# Patient Record
Sex: FEMALE | URBAN - METROPOLITAN AREA
[De-identification: names, ages, dates, MRNs, and addresses within clinical notes are randomized per-mention and may not be internally consistent; named-entity substitution may affect disease eponyms.]

---

## 2022-04-28 PROBLEM — Z00.00 ENCOUNTER FOR PREVENTIVE HEALTH EXAMINATION: Status: ACTIVE | Noted: 2022-04-28

## 2023-11-04 ENCOUNTER — EMERGENCY (EMERGENCY)
Facility: HOSPITAL | Age: 88
LOS: 1 days | Discharge: HOME | End: 2023-11-04
Attending: STUDENT IN AN ORGANIZED HEALTH CARE EDUCATION/TRAINING PROGRAM | Admitting: STUDENT IN AN ORGANIZED HEALTH CARE EDUCATION/TRAINING PROGRAM
Payer: MEDICARE

## 2023-11-04 VITALS
OXYGEN SATURATION: 97 % | HEART RATE: 83 BPM | RESPIRATION RATE: 17 BRPM | SYSTOLIC BLOOD PRESSURE: 172 MMHG | DIASTOLIC BLOOD PRESSURE: 83 MMHG

## 2023-11-04 VITALS
WEIGHT: 100.09 LBS | DIASTOLIC BLOOD PRESSURE: 78 MMHG | OXYGEN SATURATION: 94 % | TEMPERATURE: 98 F | RESPIRATION RATE: 19 BRPM | HEART RATE: 89 BPM | SYSTOLIC BLOOD PRESSURE: 138 MMHG

## 2023-11-04 PROCEDURE — 99284 EMERGENCY DEPT VISIT MOD MDM: CPT

## 2023-11-04 PROCEDURE — 72192 CT PELVIS W/O DYE: CPT | Mod: MA

## 2023-11-04 PROCEDURE — 72125 CT NECK SPINE W/O DYE: CPT | Mod: 26,MA

## 2023-11-04 PROCEDURE — 70450 CT HEAD/BRAIN W/O DYE: CPT | Mod: 26,MA

## 2023-11-04 PROCEDURE — 72125 CT NECK SPINE W/O DYE: CPT | Mod: MA

## 2023-11-04 PROCEDURE — 70450 CT HEAD/BRAIN W/O DYE: CPT | Mod: MA

## 2023-11-04 PROCEDURE — 99284 EMERGENCY DEPT VISIT MOD MDM: CPT | Mod: 25

## 2023-11-04 PROCEDURE — 72192 CT PELVIS W/O DYE: CPT | Mod: 26,MA

## 2023-11-04 NOTE — ED PROVIDER NOTE - PHYSICAL EXAMINATION
General appearance: Nontoxic appearing, conversant, afebrile   Eyes: anicteric sclerae, FELISA, EOMI  HENT: Atraumatic; oropharynx clear, MMM and no ulcerations, no pharyngeal erythema or exudate  Neck: Trachea midline; Full range of motion, supple  Pulm: CTA bl, normal respiratory effort and no intercostal retractions, normal work of breathing  CV: Systolic murmur noted, RRR, No rubs, or gallops  Abdomen: Soft, non-tender, non-distended; no guarding or rebound  Extremities: No peripheral edema, no gross deformities, FROM x4, 5/5 MS x4, gross sensation intact   Muscular Skeletal: Left Hip Tenderness on palpation   Skin: Left hand hematoma. Dry, normal temperature, turgor and texture; no rash  Psych: A&Ox2 at baseline. Appropriate affect, cooperative

## 2023-11-04 NOTE — ED PROVIDER NOTE - PATIENT PORTAL LINK FT
You can access the FollowMyHealth Patient Portal offered by [facility] by registering at the following website: [email or web address]. By joining [facility]’s FollowMyHealth portal, you will also be able to view your health information using other applications (apps) compatible with our system.

## 2023-11-04 NOTE — ED PROVIDER NOTE - MUSCULOSKELETAL, MLM
Spine appears normal, no midline tenderness, deformity, instability or step-off of C/T/L spine.  Pelvis Stable, tenderness noted to pelvis; superior illiac crest/anterior illiac spine. Full passive ROM of hips.

## 2023-11-04 NOTE — ED PROVIDER NOTE - NEUROLOGICAL, MLM
Spoke with patient and she is doing well after surgery. Patient has no questions or concerns.    Alert no focal deficits

## 2023-11-04 NOTE — ED PROVIDER NOTE - NSFOLLOWUPINSTRUCTIONS_ED_ALL_ED_FT
-Follow-up with your doctor.    -Continue medications as prescribed.    -Tylenol as directed for pain.    -Ice for not more than 20 minutes as often as every 2-3 hours as needed for pain to left hip contusion.    -Return to ED for any concerns.    Head Injury, Adult    There are many types of head injuries. They can be as minor as a small bump. Some head injuries can be worse. Worse injuries include:  A strong hit to the head that shakes the brain back and forth, causing damage (concussion).  A bruise (contusion) of the brain. This means there is bleeding in the brain that can cause swelling.  A cracked skull (skull fracture).  Bleeding in the brain that gathers, gets thick (makes a clot), and forms a bump (hematoma).  Most problems from a head injury come in the first 24 hours. However, you may still have side effects up to 7–10 days after your injury. It is important to watch your condition for any changes. You may need to be watched in the emergency department or urgent care, or you may need to stay in the hospital.    What are the causes?  There are many possible causes of a head injury. A serious head injury may be caused by:  A car accident.  Bicycle or motorcycle accidents.  Sports injuries.  Falls.  Being hit by an object.  What are the signs or symptoms?  Symptoms of a head injury include a bruise, bump, or bleeding where the injury happened. Other physical symptoms may include:  Headache.  Feeling like you may vomit (nauseous) or vomiting.  Dizziness.  Blurred or double vision.  Being uncomfortable around bright lights or loud noises.  Shaking movements that you cannot control (seizures).  Feeling tired.  Trouble being woken up.  Fainting or loss of consciousness.  Mental or emotional symptoms may include:  Feeling grumpy or cranky.  Confusion and memory problems.  Having trouble paying attention or concentrating.  Changes in eating or sleeping habits.  Feeling worried or nervous (anxious).  Feeling sad (depressed).  How is this treated?  Treatment for this condition depends on how severe the injury is and the type of injury you have. The main goal is to prevent problems and to allow the brain time to heal.    Mild head injury    If you have a mild head injury, you may be sent home, and treatment may include:  Being watched. A responsible adult should stay with you for 24 hours after your injury and check on you often.  Physical rest.  Brain rest.  Pain medicines.  Severe head injury    If you have a severe head injury, treatment may include:  Being watched closely. This includes staying in the hospital.  Medicines to:  Help with pain.  Prevent seizures.  Help with brain swelling.  Protecting your airway and using a machine that helps you breathe (ventilator).  Treatments to watch for and manage swelling inside the brain.  Brain surgery. This may be needed to:  Remove a collection of blood or blood clots.  Stop the bleeding.  Remove a part of the skull. This allows room for the brain to swell.  Follow these instructions at home:  Activity    Rest.  Avoid activities that are hard or tiring.  Make sure you get enough sleep.  Let your brain rest. Do this by limiting activities that need a lot of thought or attention, such as:  Watching TV.  Playing memory games and puzzles.  Job-related work or homework.  Working on the computer, social media, and texting.  Avoid activities that could cause another head injury until your doctor says it is okay. This includes playing sports. Having another head injury, especially before the first one has healed, can be dangerous.  Ask your doctor when it is safe for you to go back to your normal activities, such as work or school. Ask your doctor for a step-by-step plan for slowly going back to your normal activities.  Ask your doctor when you can drive, ride a bicycle, or use heavy machinery. Do not do these activities if you are dizzy.  Lifestyle      Do not drink alcohol until your doctor says it is okay.  Do not use drugs.  If it is harder than usual to remember things, write them down.  If you are easily distracted, try to do one thing at a time.  Talk with family members or close friends when making important decisions.  Tell your friends, family, a trusted co-worker, and  about your injury, symptoms, and limits (restrictions). Have them watch for any problems that are new or getting worse.  General instructions    Take over-the-counter and prescription medicines only as told by your doctor.  Have someone stay with you for 24 hours after your head injury. This person should watch you for any changes in your symptoms and be ready to get help.  Keep all follow-up visits as told by your doctor. This is important.  How is this prevented?    Work on your balance and strength. This can help you avoid falls.  Wear a seat belt when you are in a moving vehicle.  Wear a helmet when you:  Ride a bicycle.  Ski.  Do any other sport or activity that has a risk of injury.  If you drink alcohol:  Limit how much you use to:  0–1 drink a day for nonpregnant women.  0–2 drinks a day for men.  Be aware of how much alcohol is in your drink. In the [place] one drink equals one 12 oz bottle of beer (355 mL), one 5 oz glass of wine (148 mL), or one 1½ oz glass of hard liquor (44 mL).  Make your home safer by:  Getting rid of clutter from the floors and stairs. This includes things that can make you trip.  Using grab bars in bathrooms and handrails by stairs.  Placing non-slip mats on floors and in bathtubs.  Putting more light in dim areas.  Where to find more information  Centers for Disease Control and Prevention: www.cdc.gov  Get help right away if:  You have:  A very bad headache that is not helped by medicine.  Trouble walking or weakness in your arms and legs.  Clear or bloody fluid coming from your nose or ears.  Changes in how you see (vision).  A seizure.  More confusion or more grumpy moods.  Your symptoms get worse.  You are sleepier than normal and have trouble staying awake.  You lose your balance.  The black centers of your eyes (pupils) change in size.  Your speech is slurred.  Your dizziness gets worse.  You vomit.  These symptoms may be an emergency. Do not wait to see if the symptoms will go away. Get medical help right away. Call your local emergency services (911 in the [place] Do not drive yourself to the hospital.    Summary  Head injuries can be as minor as a small bump. Some head injuries can be worse.  Treatment for this condition depends on how severe the injury is and the type of injury you have.  Have someone stay with you for 24 hours after your head injury.  Ask your doctor when it is safe for you to go back to your normal activities, such as work or school.  To prevent a head injury, wear a seat belt in a car, wear a helmet when you use a bicycle, limit your alcohol use, and make your home safer.

## 2023-11-04 NOTE — ED PROVIDER NOTE - OBJECTIVE STATEMENT
This is a 89-year-old female with PMH Afib on Plavix and Dementia A&O2 at baseline, BIBEMS s/p witnessed mechanical fall. EMS stated pt was sitting in wheelchair and fell on to the floor. Per EMS no LOC, no hematoma, no trauma noted. Pt endorses headache and left hip pain on palpation. Denies dizziness, SOB. 89-year-old female with a medical history including A-Fib on Plavix, aortic stenosis, breast cancer, dementia, history of TIAs, sent in from nursing home after witnessed fall, as per EMS patient was witnessed to be seated and fall forward out of a wheelchair, striking head on the ground, no LOC. Patient has dementia and is baseline alert and oriented x2, alert in ED, indicating yes/no to some questions, complaining of headache and left hip pain, denies any neck or back pain, denies any chest pain or abdominal pain, denies any other injury or complaint.

## 2023-11-04 NOTE — ED ADULT NURSE NOTE - NSFALLHARMRISKINTERV_ED_ALL_ED

## 2023-11-04 NOTE — ED PROVIDER NOTE - NSICDXPASTMEDICALHX_GEN_ALL_CORE_FT
PAST MEDICAL HISTORY:  Aortic stenosis     Atrial fibrillation     Breast cancer     Dementia     History of TIAs     HLD (hyperlipidemia)     Hypertensive heart disease     Vertebral artery aneurysm

## 2023-11-04 NOTE — ED PROVIDER NOTE - NS ED ATTENDING STATEMENT MOD
This was a shared visit with the CADE. I reviewed and verified the documentation and independently performed the documented:

## 2023-11-04 NOTE — ED PROVIDER NOTE - HEAD SHAPE
No bony tenderness, deformity or instability of skull or facial bones. No ramos signs or no raccoon eyes b/l./normal cephalic/ATRAUMATIC

## 2023-11-04 NOTE — ED ADULT NURSE NOTE - OBJECTIVE STATEMENT
Pt BIBA from SNF s/p fall from wheelchair.  Per EMS, pt fell from wheelchair and then was placed in bed after fall.  Denies any head strike or LOC.  On blood thinners.  Hx of dementia, pt is a poor historian.  Per EMS, SNF staff report baseline mental status.

## 2023-11-04 NOTE — ED PROVIDER NOTE - CLINICAL SUMMARY MEDICAL DECISION MAKING FREE TEXT BOX
89-year-old female with a medical history including A-Fib on Plavix, aortic stenosis, breast cancer, dementia, history of TIAs, sent in from nursing home after witnessed fall, as per EMS patient was witnessed to be seated and fall forward out of a wheelchair, striking head on the ground, no LOC. Patient has dementia and is baseline alert and oriented x2, alert in ED, indicating yes/no to some questions, complaining of headache and left hip pain, denies any neck or back pain, denies any chest pain or abdominal pain, denies any other injury or complaint.    Patient with some lateral left pelvis tenderness, stable, full passive range of motion bilateral lower extremities, no deformity or instability.    CT Head, Ct C-spine, CT pelvis bony, re-assess. 89-year-old female with a medical history including A-Fib on Plavix, aortic stenosis, breast cancer, dementia, history of TIAs, sent in from nursing home after witnessed fall, as per EMS patient was witnessed to be seated and fall forward out of a wheelchair, striking head on the ground, no LOC. Patient has dementia and is baseline alert and oriented x2, alert in ED, indicating yes/no to some questions, complaining of headache and left hip pain, denies any neck or back pain, denies any chest pain or abdominal pain, denies any other injury or complaint.    Patient with some lateral left pelvis tenderness, stable, full passive range of motion bilateral lower extremities, no deformity or instability.    CT Head, Ct C-spine, CT pelvis bony, re-assess.    No ICH, no acute fractures.

## 2024-09-04 NOTE — ED PROVIDER NOTE - ATTENDING APP SHARED VISIT CONTRIBUTION OF CARE
no...
I personally evaluated the patient. I reviewed the Resident’s or Physician Assistant’s note (as assigned above), and agree with the findings and plan except as documented in my note.